# Patient Record
Sex: MALE | Race: WHITE | HISPANIC OR LATINO | Employment: UNEMPLOYED | URBAN - METROPOLITAN AREA
[De-identification: names, ages, dates, MRNs, and addresses within clinical notes are randomized per-mention and may not be internally consistent; named-entity substitution may affect disease eponyms.]

---

## 2017-10-08 ENCOUNTER — HOSPITAL ENCOUNTER (EMERGENCY)
Facility: OTHER | Age: 72
Discharge: HOME | End: 2017-10-08
Attending: EMERGENCY MEDICINE

## 2017-10-08 VITALS
OXYGEN SATURATION: 98 % | HEIGHT: 61 IN | HEART RATE: 80 BPM | SYSTOLIC BLOOD PRESSURE: 138 MMHG | DIASTOLIC BLOOD PRESSURE: 58 MMHG | WEIGHT: 315 LBS | TEMPERATURE: 98 F | RESPIRATION RATE: 16 BRPM | BODY MASS INDEX: 59.47 KG/M2

## 2017-10-08 DIAGNOSIS — B02.9 HERPES ZOSTER WITHOUT COMPLICATION: Primary | ICD-10-CM

## 2017-10-08 DIAGNOSIS — R07.9 CHEST PAIN: ICD-10-CM

## 2017-10-08 LAB
ALBUMIN SERPL BCP-MCNC: 3.6 G/DL
ALP SERPL-CCNC: 57 U/L
ALT SERPL W/O P-5'-P-CCNC: 19 U/L
ANION GAP SERPL CALC-SCNC: 8 MMOL/L
AST SERPL-CCNC: 19 U/L
BASOPHILS # BLD AUTO: 0.01 K/UL
BASOPHILS NFR BLD: 0.2 %
BILIRUB SERPL-MCNC: 0.7 MG/DL
BNP SERPL-MCNC: 16 PG/ML
BUN SERPL-MCNC: 15 MG/DL
CALCIUM SERPL-MCNC: 9.2 MG/DL
CHLORIDE SERPL-SCNC: 102 MMOL/L
CO2 SERPL-SCNC: 26 MMOL/L
CREAT SERPL-MCNC: 0.9 MG/DL
DIFFERENTIAL METHOD: ABNORMAL
EOSINOPHIL # BLD AUTO: 0.2 K/UL
EOSINOPHIL NFR BLD: 2.7 %
ERYTHROCYTE [DISTWIDTH] IN BLOOD BY AUTOMATED COUNT: 14.7 %
EST. GFR  (AFRICAN AMERICAN): >60 ML/MIN/1.73 M^2
EST. GFR  (NON AFRICAN AMERICAN): >60 ML/MIN/1.73 M^2
GLUCOSE SERPL-MCNC: 288 MG/DL
HCT VFR BLD AUTO: 39.1 %
HGB BLD-MCNC: 13.3 G/DL
LYMPHOCYTES # BLD AUTO: 1.4 K/UL
LYMPHOCYTES NFR BLD: 25.5 %
MCH RBC QN AUTO: 32.4 PG
MCHC RBC AUTO-ENTMCNC: 34 G/DL
MCV RBC AUTO: 95 FL
MONOCYTES # BLD AUTO: 0.6 K/UL
MONOCYTES NFR BLD: 10.4 %
NEUTROPHILS # BLD AUTO: 3.3 K/UL
NEUTROPHILS NFR BLD: 61 %
PLATELET # BLD AUTO: 182 K/UL
PMV BLD AUTO: 8.7 FL
POTASSIUM SERPL-SCNC: 4 MMOL/L
PROT SERPL-MCNC: 7.7 G/DL
RBC # BLD AUTO: 4.1 M/UL
SODIUM SERPL-SCNC: 136 MMOL/L
TROPONIN I SERPL DL<=0.01 NG/ML-MCNC: 0.01 NG/ML
WBC # BLD AUTO: 5.46 K/UL

## 2017-10-08 PROCEDURE — 80053 COMPREHEN METABOLIC PANEL: CPT

## 2017-10-08 PROCEDURE — 93010 ELECTROCARDIOGRAM REPORT: CPT | Mod: ,,, | Performed by: INTERNAL MEDICINE

## 2017-10-08 PROCEDURE — 93005 ELECTROCARDIOGRAM TRACING: CPT

## 2017-10-08 PROCEDURE — 83880 ASSAY OF NATRIURETIC PEPTIDE: CPT

## 2017-10-08 PROCEDURE — 25000003 PHARM REV CODE 250: Performed by: EMERGENCY MEDICINE

## 2017-10-08 PROCEDURE — 84484 ASSAY OF TROPONIN QUANT: CPT

## 2017-10-08 PROCEDURE — 99284 EMERGENCY DEPT VISIT MOD MDM: CPT | Mod: 25

## 2017-10-08 PROCEDURE — 85025 COMPLETE CBC W/AUTO DIFF WBC: CPT

## 2017-10-08 RX ORDER — IBUPROFEN 400 MG/1
400 TABLET ORAL EVERY 6 HOURS PRN
Qty: 20 TABLET | Refills: 0 | Status: SHIPPED | OUTPATIENT
Start: 2017-10-08

## 2017-10-08 RX ORDER — ACYCLOVIR 800 MG/1
800 TABLET ORAL
Qty: 40 TABLET | Refills: 0 | Status: SHIPPED | OUTPATIENT
Start: 2017-10-08 | End: 2018-10-08

## 2017-10-08 RX ORDER — ASPIRIN 325 MG
325 TABLET ORAL
Status: COMPLETED | OUTPATIENT
Start: 2017-10-08 | End: 2017-10-08

## 2017-10-08 RX ORDER — ACYCLOVIR 400 MG/1
400 TABLET ORAL
Status: COMPLETED | OUTPATIENT
Start: 2017-10-08 | End: 2017-10-08

## 2017-10-08 RX ADMIN — ACYCLOVIR 400 MG: 400 TABLET ORAL at 11:10

## 2017-10-08 RX ADMIN — ASPIRIN 325 MG ORAL TABLET 325 MG: 325 PILL ORAL at 11:10

## 2017-10-08 NOTE — ED NOTES
Patient Identifiers for Trevon Olguin checked and correct  LOC: The patient is awake, alert and aware of environment with an appropriate affect, the patient is oriented x 3 and speaking appropriate.  APPEARANCE: Patient resting comfortably and in no acute distress. The patient is clean and well groomed. The patient's clothing is properly fastened.  SKIN: The skin is warm and dry. The patient has delayed skin turgor and moist mucus membranes.  Pt has red, edematous & scabbing rash on left scapula extending to the anterior ribs. No drainage at present.  Musculoskeletal :  Normal range of motion noted. Moves all extremities well.  RESPIRATORY: Airway is open and patent, respirations are spontaneous, patient has a normal effort and rate.  PULSES: 2+ radial pulses, symmetrical in all extremities.        Will continue to monitor

## 2017-10-08 NOTE — ED NOTES
Pt AAOx4 and appropriate at this time. Respirations even and unlabored. No acute distress noted. Awaiting further orders. Pt updated on POC. Call bell within reach and pt oriented to use of call bell. Pt on continuous cardiac monitoring, continuous pulse ox, and continuous BP cuff. Will continue to monitor.

## 2017-10-08 NOTE — ED PROVIDER NOTES
"Encounter Date: 10/8/2017    SCRIBE #1 NOTE: I, Layne Santos, am scribing for, and in the presence of, Dr. Echeverria.       History     Chief Complaint   Patient presents with    Herpes Zoster     Pt Gibraltarian speaking, grandson translating for pt. States, "He thinks he has shingles." Pt has rash to left chest and left back, painful 8/10 x 4 days.     Time seen by provider: 10:31 AM    This is a 71 y.o. male who presents with complaint of rash that started four days ago. Per the patient's relative (), the patient believes he currently has Shingles. Rash is described as constant, painful, and located on the left side of the patient's chest with radiation to part of the back. Per relative, the patient notes associated chest pain, but denies shortness of breath, palpitations, fever, chills, nausea, or vomiting. He reports no identifying, alleviating, or exacerbating factors. Symptoms have not been experienced previously. Of note, the patient saw Dr. Rick (cardiodologist) approximately one year, and exam was normal.       The history is provided by the patient and a relative (relative translated for patient.).     Review of patient's allergies indicates:  No Known Allergies  Past Medical History:   Diagnosis Date    Diabetes mellitus, type 2      History reviewed. No pertinent surgical history.  Family History   Problem Relation Age of Onset    Diabetes Father      Social History   Substance Use Topics    Smoking status: Former Smoker    Smokeless tobacco: Never Used    Alcohol use Yes      Comment: occasionally     Review of Systems   Constitutional: Negative for chills, diaphoresis and fever.   HENT: Negative for congestion and sore throat.    Eyes: Negative for pain and redness.   Respiratory: Negative for cough, shortness of breath and wheezing.    Cardiovascular: Positive for chest pain. Negative for palpitations.   Gastrointestinal: Negative for diarrhea, nausea and vomiting.   Genitourinary: Negative " for dysuria.   Musculoskeletal: Negative for back pain, neck pain and neck stiffness.   Skin: Positive for rash.   Neurological: Negative for weakness.   Hematological: Does not bruise/bleed easily.       Physical Exam     Initial Vitals [10/08/17 1008]   BP Pulse Resp Temp SpO2   (!) 150/70 93 16 97.7 °F (36.5 °C) 99 %      MAP       96.67         Physical Exam    Nursing note and vitals reviewed.  Constitutional: He appears well-developed and well-nourished. He is not diaphoretic. No distress.   HENT:   Head: Normocephalic and atraumatic.   Right Ear: External ear normal.   Left Ear: External ear normal.   Mouth/Throat: Oropharynx is clear and moist.   Eyes: EOM are normal. Pupils are equal, round, and reactive to light. No scleral icterus.   Neck: Normal range of motion. Neck supple.   Cardiovascular: Normal rate, regular rhythm and normal heart sounds. Exam reveals no gallop and no friction rub.    No murmur heard.  Pulmonary/Chest: Breath sounds normal. No respiratory distress. He has no wheezes. He has no rhonchi. He has no rales.   Abdominal: Soft. Bowel sounds are normal. He exhibits no distension. There is no tenderness. There is no rebound and no guarding.   Musculoskeletal: Normal range of motion. He exhibits no edema or tenderness.   Lymphadenopathy:     He has no cervical adenopathy.   Neurological: He is alert and oriented to person, place, and time.   Skin: Skin is warm and dry. Capillary refill takes less than 2 seconds. No abscess noted. No erythema. No pallor.   Vesicular lesions to left anterior chest wall, left axillary wall, and left thoracic back. Tender to touch and did not cross midline.    Psychiatric: He has a normal mood and affect. His behavior is normal. Judgment and thought content normal.         ED Course   Procedures  Labs Reviewed - No data to display  EKG Readings: (Independently Interpreted)   EKG Reading (10:48AM)- Normal sinus rhythm at a rate of 83 bpm. No STEMI. No abnormal  "ST-T wave changes.           Medical Decision Making:   Independently Interpreted Test(s):   I have ordered and independently interpreted EKG Reading(s) - see prior notes  Clinical Tests:   Lab Tests: Ordered and Reviewed  Medical Tests: Ordered and Reviewed  ED Management:  Elderly Bahamian-speaking male presents complaining of 4 days of left-sided chest pain.  It has been constant.  He does have zoster type rash.  He has a few scattered lesions on his cheek, none on his nose ear or around his eye.  He reports he is also concerned because the pain is "inside "according to his .  He is worried about his heart, though he has never had any heart problems in the past.  I think that is most likely related to the zoster, but due to his has age, I will obtain EKG in one troponin due to 4 days of continual chest pain.  Both these are negative for ischemia.  Start on acyclovir.  Counseled follow-up primary care.    I did have an extensive talk regarding signs to return for and need for follow up. Patient expressed understanding and will monitor symptoms closely and follow-up as needed.    NIKO Echeverria M.D.  10/08/2017  11:47 AM              Scribe Attestation:   Scribe #1: I performed the above scribed service and the documentation accurately describes the services I performed. I attest to the accuracy of the note.    Attending Attestation:           Physician Attestation for Scribe:  Physician Attestation Statement for Scribe #1: I, Dr. Echeverria, reviewed documentation, as scribed by Layne Santos in my presence, and it is both accurate and complete.                 ED Course      Clinical Impression:     1. Herpes zoster without complication    2. Chest pain                               Matti Echeverria MD  10/08/17 1148    "

## 2020-04-20 ENCOUNTER — HOSPITAL ENCOUNTER (EMERGENCY)
Facility: HOSPITAL | Age: 75
Discharge: HOME | End: 2020-04-21
Attending: EMERGENCY MEDICINE

## 2020-04-20 DIAGNOSIS — F10.920 ALCOHOLIC INTOXICATION WITHOUT COMPLICATION: Primary | ICD-10-CM

## 2020-04-20 LAB — GLUCOSE SERPL-MCNC: 124 MG/DL (ref 70–110)

## 2020-04-20 PROCEDURE — 99283 EMERGENCY DEPT VISIT LOW MDM: CPT

## 2020-04-21 VITALS
HEIGHT: 63 IN | OXYGEN SATURATION: 100 % | HEART RATE: 80 BPM | BODY MASS INDEX: 68.34 KG/M2 | DIASTOLIC BLOOD PRESSURE: 57 MMHG | RESPIRATION RATE: 18 BRPM | TEMPERATURE: 98 F | SYSTOLIC BLOOD PRESSURE: 100 MMHG

## 2020-04-21 NOTE — ED NOTES
Pt reports drinking multiple cans of budweiser beginning at noon today. Pt unsure as to how many cans he drank. Pt appears to be a poor historian. He is calm and compliant. He denies any complaints at this time.

## 2020-04-21 NOTE — ED NOTES
Per MD order, pt ambulated around ED. Pt able to ambulate w/o difficulty in a straight line. Pt is AAOX4, denies any discomfort at this time. Pt reports that he is ready to go home. MD able to visualize pt walking.

## 2020-04-21 NOTE — ED NOTES
Patient came in to the ER with complaints of alcohol intoxication. The patient stated that he started drinking around 1200 and doesn't know how many budweiser's he had. Patient is currently asleep, skin warm and dry, will continue to monitor.

## 2020-04-21 NOTE — ED PROVIDER NOTES
Encounter Date: 4/20/2020    SCRIBE #1 NOTE: I, Allan Roach, am scribing for, and in the presence of,  Ephraim Estrada MD. I have scribed the following portions of the note - Other sections scribed: HPI, ROS, PE, MDM.       History     Chief Complaint   Patient presents with    Alcohol Intoxication     Patient was found on side the road. Patient denies cp/sob no bruises noted     Trevon Olguin is an 74 y.o. male presenting to the ED via EMS complaining of a sudden onset of alcohol intoxication that started today. Patient is Mongolian-speaking with translation provided by ED nurse. Nurse reports that EMS found patient on the side of the road and presented him to the ED. Nurse states that patient does not recall which street he was on or who contacted EMS. Nurse states that patient had ingested Budweiser today. Patient denies abdominal pain or chest pain. Patient reports a past medical history of diabetes mellitus. Patient states he has a place to stay in [facility]. No known drug allergies. No tobacco or street drug abuse.      The history is provided by the patient. A  was used (ED Nurse).     Review of patient's allergies indicates:  No Known Allergies  Past Medical History:   Diagnosis Date    Diabetes mellitus, type 2      No past surgical history on file.  Family History   Problem Relation Age of Onset    Diabetes Father      Social History     Tobacco Use    Smoking status: Former Smoker    Smokeless tobacco: Never Used   Substance Use Topics    Alcohol use: Yes     Comment: occasionally    Drug use: No     Review of Systems   Constitutional: Negative for fever.   HENT: Negative for sore throat.    Eyes: Negative for visual disturbance.   Respiratory: Negative for shortness of breath.    Cardiovascular: Negative for chest pain.   Gastrointestinal: Negative for abdominal pain.   Genitourinary: Negative for dysuria.   Musculoskeletal: Negative for back pain.   Skin: Negative for  rash.   Neurological: Negative for headaches.       Physical Exam     Initial Vitals [04/20/20 1944]   BP Pulse Resp Temp SpO2   (!) 142/80 90 18 98.1 °F (36.7 °C) 98 %      MAP       --         Physical Exam    Nursing note and vitals reviewed.  Constitutional: He appears well-developed and well-nourished.   HENT:   Head: Normocephalic and atraumatic.   Eyes: EOM are normal. Pupils are equal, round, and reactive to light.   Neck: Normal range of motion. Neck supple. No thyromegaly present.   Cardiovascular: Normal rate and regular rhythm. Exam reveals no gallop and no friction rub.    No murmur heard.  Pulmonary/Chest: Breath sounds normal. No respiratory distress.   Abdominal: Soft. Bowel sounds are normal.   Musculoskeletal: Normal range of motion. He exhibits no edema or tenderness.   Lymphadenopathy:     He has no cervical adenopathy.   Neurological: He is alert and oriented to person, place, and time.   GCS is 14 with mild confusion due to alcohol intake.   Skin: Skin is warm and dry. Capillary refill takes less than 2 seconds. Abrasion noted.   Old abrasions to right proximal lateral forearm.         ED Course   Procedures  Labs Reviewed - No data to display       Imaging Results    None          Medical Decision Making:   Initial Assessment:   Trevon Olguin is an 74 y.o. male presenting to the ED via EMS complaining of a sudden onset of alcohol intoxication that started today. Will order POCT glucose.Will reassess.  Clinical Tests:   Lab Tests: Reviewed and Ordered    Patient observed until clinically sober. Stable for discharge. patient has no complaints.           Scribe Attestation:   Scribe #1: I performed the above scribed service and the documentation accurately describes the services I performed. I attest to the accuracy of the note.                          Clinical Impression:     1. Alcoholic intoxication without complication                ED Disposition Condition    Discharge Stable        ED  Prescriptions     None        Follow-up Information     Follow up With Specialties Details Why Contact Info    [facility]  As needed [address] [place] [address] [phone]                            I, Ephraim Estrada, personally performed the services described in this documentation. All medical record entries made by the scribe were at my direction and in my presence. I have reviewed the chart and agree that the record reflects my personal performance and is accurate and complete.           Ephraim Estrada MD  04/25/20 4917

## 2020-04-28 LAB — POCT GLUCOSE: 124 MG/DL (ref 70–110)
